# Patient Record
Sex: MALE | Race: BLACK OR AFRICAN AMERICAN | NOT HISPANIC OR LATINO | URBAN - METROPOLITAN AREA
[De-identification: names, ages, dates, MRNs, and addresses within clinical notes are randomized per-mention and may not be internally consistent; named-entity substitution may affect disease eponyms.]

---

## 2022-04-02 ENCOUNTER — EMERGENCY (EMERGENCY)
Facility: HOSPITAL | Age: 42
LOS: 1 days | Discharge: HOME | End: 2022-04-02
Attending: EMERGENCY MEDICINE | Admitting: EMERGENCY MEDICINE
Payer: MEDICAID

## 2022-04-02 VITALS
TEMPERATURE: 98 F | HEART RATE: 77 BPM | OXYGEN SATURATION: 98 % | SYSTOLIC BLOOD PRESSURE: 98 MMHG | RESPIRATION RATE: 20 BRPM | DIASTOLIC BLOOD PRESSURE: 78 MMHG

## 2022-04-02 DIAGNOSIS — S43.004A UNSPECIFIED DISLOCATION OF RIGHT SHOULDER JOINT, INITIAL ENCOUNTER: ICD-10-CM

## 2022-04-02 DIAGNOSIS — M25.511 PAIN IN RIGHT SHOULDER: ICD-10-CM
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
## 2022-04-02 DIAGNOSIS — Z87.828 PERSONAL HISTORY OF OTHER (HEALED) PHYSICAL INJURY AND TRAUMA: ICD-10-CM

## 2022-04-02 PROCEDURE — 73030 X-RAY EXAM OF SHOULDER: CPT | Mod: 26

## 2022-04-02 PROCEDURE — 23650 CLTX SHO DSLC W/MNPJ WO ANES: CPT | Mod: 54

## 2022-04-02 PROCEDURE — 99284 EMERGENCY DEPT VISIT MOD MDM: CPT | Mod: 25,57

## 2022-04-02 PROCEDURE — 73030 X-RAY EXAM OF SHOULDER: CPT | Mod: 26,RT

## 2022-04-02 NOTE — ED PROVIDER NOTE - PATIENT PORTAL LINK FT
You can access the FollowMyHealth Patient Portal offered by [facility] by registering at the following website: [email or web address]. By joining [employer]’s FollowMyHealth portal, you will also be able to view your health information using other applications (apps) compatible with our system.
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
## 2022-04-02 NOTE — ED PROVIDER NOTE - NSFOLLOWUPINSTRUCTIONS_ED_ALL_ED_FT
Shoulder Dislocation       Your shoulder joint is made up of 3 bones:  •The upper arm bone (humerus).      •The shoulder blade (scapula).      •The collarbone (clavicle).      A shoulder dislocation happens when your upper arm bone moves out of its normal place in your shoulder joint.      What are the causes?    This condition is often caused by:  •A fall.      •A hard, direct hit to the shoulder.      •A forceful movement of the shoulder.        What increases the risk?    You are more likely to develop this condition if you play sports.      What are the signs or symptoms?     •Bad shape (deformity) of the shoulder.      •Very bad pain.      •A shoulder that you cannot move.      •Numbness, weakness, or tingling in your neck or down your arm.      •Bruising or swelling around your shoulder.        How is this treated?    This condition is treated with a procedure called a reduction. This is done to place the upper arm bone back in the joint. There are two types of reduction:  •Closed reduction. The upper arm bone is placed back in the joint without surgery. The doctor uses his or her hands to guide the bone back into place.    •Open reduction. Surgery is done to place the upper arm bone back in the joint. This may be needed if:  •You have a weak shoulder joint or weak tissues that connect bones to each other (ligaments).      •You have had more than one shoulder dislocation.      •The nerves or blood vessels around your shoulder have been damaged.        After the procedure, you will wear a brace or sling to prevent the arm from moving.    After the brace or sling is removed, you will have physical therapy to help improve movement (range of motion) in your shoulder joint.      Follow these instructions at home:    Medicines     •Take over-the-counter and prescription medicines only as told by your doctor.    •Ask your doctor if the medicine prescribed to you:   •Requires you to avoid driving or using heavy machinery.     •Can cause trouble pooping (constipation). You may need to take steps to prevent or treat trouble pooping:  •Drink enough fluid to keep your pee (urine) pale yellow.      •Take over-the-counter or prescription medicines.      •Eat foods that are high in fiber. These include beans, whole grains, and fresh fruits and vegetables.       •Limit foods that are high in fat and sugar. These include fried or sweet foods.          If you have a brace or sling:     •Wear the brace or sling as told by your doctor. Remove it only as told by your doctor.    •Loosen the brace or sling if your fingers:  •Tingle.      •Become numb.      •Turn cold or blue.        •Keep the brace or sling clean.    •If the brace or sling is not waterproof:  •Do not let it get wet.      •Cover it with a watertight covering when you take a bath or shower.          Managing pain, stiffness, and swelling    •If told, put ice on the injured area.  •If you can remove your brace or sling, remove it as told by your doctor.      •Put ice in a plastic bag.      •Place a towel between your skin and the bag.      •Leave the ice on for 20 minutes, 2–3 times per day.        •Move your fingers often.      •Raise (elevate) the injured area above the level of your heart while you are sitting or lying down.      Activity     • Do not lift your arm above shoulder level until your doctor approves.      • Do not lift anything until your doctor says that it is safe.      • Do not push or pull things until your doctor approves.      •Return to your normal activities as told by your doctor. Ask your doctor what activities are safe for you.      •Do range-of-motion exercises only as told by your doctor.      •Exercise your hand by squeezing a soft ball. This keeps your hand and wrist from getting stiff and swollen.      General instructions     • Do not drive while you are wearing a brace or sling on a hand that you use for driving.      • Do not take baths, swim, or use a hot tub until your doctor approves. Ask your doctor if you may take showers. You may only be allowed to take sponge baths.      • Do not use any tobacco products, including cigarettes, chewing tobacco, or e-cigarettes. These can delay healing. If you need help quitting, ask your doctor.      •Keep all follow-up visits as told by your doctor. This is important.        Contact a doctor if:    •Your brace or sling gets damaged.        Get help right away if:    •Your pain gets worse, not better.      •You lose feeling in your arm or hand.      •Your arm or hand turns white and cold.        Summary    •A shoulder dislocation happens when your upper arm bone moves out of its normal place in your shoulder joint.      •It is often caused by a fall, a strong hit to the shoulder, or a forceful movement of the shoulder.      •It causes very bad pain. You may not be able to move your shoulder.      •This condition is treated with either closed or open reduction. You will also be given a brace or sling. You will do exercises to improve movement in your shoulder joint.      •Contact a doctor if your brace or sling gets damaged. Get help right away if your pain gets worse, you lose feeling in your arm or hand, or your arm or hand turns white or cold.      This information is not intended to replace advice given to you by your health care provider. Make sure you discuss any questions you have with your health care provider.

## 2022-04-02 NOTE — ED POST DISCHARGE NOTE - ADDITIONAL DOCUMENTATION
the patient called the ER today - 4/11/22 - I told him about the xray readings and encouraged him to follow up with orthopedics - all questions answered
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
## 2022-04-02 NOTE — ED PROVIDER NOTE - CARE PROVIDER_API CALL
Jimi Reddy)  Orthopaedic Surgery  [address]  [place], [place] [address]  Phone: [phone]  Fax: [phone]  Follow Up Time:

## 2022-04-02 NOTE — ED PROVIDER NOTE - LOCATION
shoulder deformity, distal pulses intact, distal median/radial/ulnar nn intact to motor and sensory./shoulder

## 2024-11-19 NOTE — ED ADULT NURSE NOTE - PRO INTERPRETER NEED 2
Detail Level: Detailed
English
Detail Level: Generalized
Sunscreen Recommendations: broad spectrum sunscreen SPF 30 or greater daily, reapply at least every 2 hours
Sunscreen Recommendation Label Override: Sunscreen